# Patient Record
Sex: FEMALE | Race: WHITE | URBAN - METROPOLITAN AREA
[De-identification: names, ages, dates, MRNs, and addresses within clinical notes are randomized per-mention and may not be internally consistent; named-entity substitution may affect disease eponyms.]

---

## 2022-08-12 ENCOUNTER — OFFICE VISIT (OUTPATIENT)
Dept: URBAN - METROPOLITAN AREA CLINIC 40 | Facility: CLINIC | Age: 82
End: 2022-08-12
Payer: MEDICARE

## 2022-08-12 VITALS
HEART RATE: 76 BPM | BODY MASS INDEX: 31.36 KG/M2 | SYSTOLIC BLOOD PRESSURE: 136 MMHG | WEIGHT: 177 LBS | HEIGHT: 63 IN | DIASTOLIC BLOOD PRESSURE: 80 MMHG

## 2022-08-12 DIAGNOSIS — K76.89 LIVER CYST: ICD-10-CM

## 2022-08-12 DIAGNOSIS — R17 JAUNDICE: ICD-10-CM

## 2022-08-12 DIAGNOSIS — K21.9 GERD WITHOUT ESOPHAGITIS: ICD-10-CM

## 2022-08-12 PROCEDURE — 99204 OFFICE O/P NEW MOD 45 MIN: CPT | Performed by: INTERNAL MEDICINE

## 2022-08-12 RX ORDER — LABETALOL HYDROCHLORIDE 100 MG/1
AS DIRECTED TABLET ORAL
Status: ACTIVE | COMMUNITY

## 2022-08-12 NOTE — HPI-TODAY'S VISIT:
Patient is referred by Dr. Pastor Pappas for evaluation of a  Recent CT abdomen last week at [facility] confirmed liver cyst.  There is a large 10 cm liver cyst arising from the right lobe, multiple satellite cysts noted.  This was noted on ultrasound prior in 2018 is also being 10 cm, no apparent change in size.  Gallbladder normal.  Biliary and pancreatic ducts normal.  Otherwise negative CT scan.  Patient has occasional right upper quadrant discomfort but no jaundice.  History of GERD, taking lansoprazole 50 mg daily.  Doing well in this regard, PPI therapy seems to help and she is PPI dependent.  No dysphagia.  Bilirubin 1.85 AST 28 ALT 24 alk phos 250 creatinine 1.42 TSH 3.3 CBC normal and platelet 185

## 2022-08-18 ENCOUNTER — TELEPHONE ENCOUNTER (OUTPATIENT)
Dept: URBAN - METROPOLITAN AREA CLINIC 40 | Facility: CLINIC | Age: 82
End: 2022-08-18

## 2022-08-18 ENCOUNTER — LAB OUTSIDE AN ENCOUNTER (OUTPATIENT)
Dept: URBAN - METROPOLITAN AREA CLINIC 40 | Facility: CLINIC | Age: 82
End: 2022-08-18

## 2022-08-19 LAB
BILIRUBIN, DIRECT: 0.3
BILIRUBIN, TOTAL: 0.7

## 2022-09-23 ENCOUNTER — OFFICE VISIT (OUTPATIENT)
Dept: URBAN - METROPOLITAN AREA CLINIC 40 | Facility: CLINIC | Age: 82
End: 2022-09-23
Payer: MEDICARE

## 2022-09-23 ENCOUNTER — WEB ENCOUNTER (OUTPATIENT)
Dept: URBAN - METROPOLITAN AREA CLINIC 40 | Facility: CLINIC | Age: 82
End: 2022-09-23

## 2022-09-23 VITALS
BODY MASS INDEX: 30.72 KG/M2 | HEIGHT: 63 IN | WEIGHT: 173.4 LBS | HEART RATE: 68 BPM | SYSTOLIC BLOOD PRESSURE: 162 MMHG | OXYGEN SATURATION: 98 % | DIASTOLIC BLOOD PRESSURE: 92 MMHG | TEMPERATURE: 98.1 F

## 2022-09-23 DIAGNOSIS — E80.4 GILBERT SYNDROME: ICD-10-CM

## 2022-09-23 DIAGNOSIS — K21.9 GERD WITHOUT ESOPHAGITIS: ICD-10-CM

## 2022-09-23 DIAGNOSIS — K76.89 LIVER CYST: ICD-10-CM

## 2022-09-23 DIAGNOSIS — R17 JAUNDICE: ICD-10-CM

## 2022-09-23 PROBLEM — 266435005: Status: ACTIVE | Noted: 2022-08-12

## 2022-09-23 PROBLEM — 85057007: Status: ACTIVE | Noted: 2022-08-12

## 2022-09-23 PROBLEM — 27503000: Status: ACTIVE | Noted: 2022-09-23

## 2022-09-23 PROCEDURE — 99214 OFFICE O/P EST MOD 30 MIN: CPT | Performed by: INTERNAL MEDICINE

## 2022-09-23 RX ORDER — LABETALOL HYDROCHLORIDE 100 MG/1
AS DIRECTED TABLET ORAL
Status: ON HOLD | COMMUNITY
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
## 2023-09-22 ENCOUNTER — OFFICE VISIT (OUTPATIENT)
Dept: URBAN - METROPOLITAN AREA CLINIC 40 | Facility: CLINIC | Age: 83
End: 2023-09-22
Payer: MEDICARE

## 2023-09-22 ENCOUNTER — DASHBOARD ENCOUNTERS (OUTPATIENT)
Age: 83
End: 2023-09-22

## 2023-09-22 VITALS
HEART RATE: 74 BPM | SYSTOLIC BLOOD PRESSURE: 132 MMHG | DIASTOLIC BLOOD PRESSURE: 80 MMHG | HEIGHT: 63 IN | WEIGHT: 173.4 LBS | BODY MASS INDEX: 30.72 KG/M2 | TEMPERATURE: 97.3 F

## 2023-09-22 DIAGNOSIS — K76.89 LIVER CYST: ICD-10-CM

## 2023-09-22 DIAGNOSIS — E80.4 GILBERT SYNDROME: ICD-10-CM

## 2023-09-22 DIAGNOSIS — K21.9 GERD WITHOUT ESOPHAGITIS: ICD-10-CM

## 2023-09-22 DIAGNOSIS — R17 JAUNDICE: ICD-10-CM

## 2023-09-22 PROCEDURE — 99213 OFFICE O/P EST LOW 20 MIN: CPT | Performed by: INTERNAL MEDICINE

## 2023-09-22 RX ORDER — AMLODIPINE BESYLATE 5 MG/1
1 TABLET TABLET ORAL ONCE A DAY
Status: ACTIVE | COMMUNITY

## 2023-09-22 RX ORDER — MULTIVITAMIN WITH IRON
1 TABLET WITH A MEAL TABLET ORAL ONCE A DAY
Status: ACTIVE | COMMUNITY

## 2023-09-22 RX ORDER — LANSOPRAZOLE 15 MG/1
1 TABLET TABLET, ORALLY DISINTEGRATING, DELAYED RELEASE ORAL ONCE A DAY
Status: ACTIVE | COMMUNITY

## 2023-09-22 RX ORDER — LABETALOL HYDROCHLORIDE 100 MG/1
AS DIRECTED TABLET ORAL
Status: ON HOLD | COMMUNITY
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
## 2023-09-22 NOTE — PHYSICAL EXAM SKIN:
no rashes , no jaundice present , good turgor , no masses , no tenderness on palpation
PROCEDURES:  Salpingectomy, bilateral, total, laparoscopic 11-Oct-2021 11:28:02  Rogers De Jesus